# Patient Record
Sex: FEMALE | URBAN - METROPOLITAN AREA
[De-identification: names, ages, dates, MRNs, and addresses within clinical notes are randomized per-mention and may not be internally consistent; named-entity substitution may affect disease eponyms.]

---

## 2018-03-16 ENCOUNTER — OFFICE VISIT (OUTPATIENT)
Dept: FAMILY MEDICINE | Facility: CLINIC | Age: 28
End: 2018-03-16
Payer: COMMERCIAL

## 2018-03-16 VITALS
SYSTOLIC BLOOD PRESSURE: 112 MMHG | TEMPERATURE: 98.2 F | RESPIRATION RATE: 16 BRPM | HEIGHT: 67 IN | DIASTOLIC BLOOD PRESSURE: 76 MMHG | BODY MASS INDEX: 31.39 KG/M2 | HEART RATE: 79 BPM | WEIGHT: 200 LBS | OXYGEN SATURATION: 95 %

## 2018-03-16 DIAGNOSIS — N89.8 VAGINAL DISCHARGE: Primary | ICD-10-CM

## 2018-03-16 DIAGNOSIS — Z12.4 PAPANICOLAOU SMEAR FOR CERVICAL CANCER SCREENING: ICD-10-CM

## 2018-03-16 DIAGNOSIS — A59.01 TRICHOMONIASIS OF VAGINA: ICD-10-CM

## 2018-03-16 PROBLEM — F19.90 SUBSTANCE USE DISORDER: Status: ACTIVE | Noted: 2018-03-16

## 2018-03-16 LAB
BACTERIA: NORMAL
CLUE CELLS: NORMAL
MOTILE TRICHOMONAS: POSITIVE
ODOR: POSITIVE
PH WET PREP: 5.5
WBC WET PREP: NORMAL
YEAST: NORMAL

## 2018-03-16 RX ORDER — HYDROXYZINE HYDROCHLORIDE 50 MG/1
50 TABLET, FILM COATED ORAL
COMMUNITY
Start: 2018-03-02

## 2018-03-16 RX ORDER — METRONIDAZOLE 500 MG/1
500 TABLET ORAL 3 TIMES DAILY
Qty: 21 TABLET | Refills: 0 | Status: SHIPPED | OUTPATIENT
Start: 2018-03-16

## 2018-03-16 RX ORDER — HYDROXYZINE HYDROCHLORIDE 25 MG/1
25 TABLET, FILM COATED ORAL
COMMUNITY

## 2018-03-16 RX ORDER — PRAZOSIN HYDROCHLORIDE 1 MG/1
1 CAPSULE ORAL
COMMUNITY
Start: 2018-03-02

## 2018-03-16 RX ORDER — MIRTAZAPINE 15 MG/1
15 TABLET, FILM COATED ORAL
COMMUNITY
Start: 2018-03-02

## 2018-03-16 ASSESSMENT — ENCOUNTER SYMPTOMS
DIAPHORESIS: 0
LIGHT-HEADEDNESS: 0
NAUSEA: 0
DIZZINESS: 0
RHINORRHEA: 0
ABDOMINAL PAIN: 0
CHILLS: 0
VOMITING: 0
HEADACHES: 0
DIARRHEA: 0
SHORTNESS OF BREATH: 0
DIFFICULTY URINATING: 0
FEVER: 0
CONSTIPATION: 0
COUGH: 0
SORE THROAT: 0

## 2018-03-16 NOTE — PROGRESS NOTES
"      HPI:       Pam Torres is a 27 year old who presents for the following  Patient presents with:  Prenatal Care: new [facility] patient. bacterial discharge    Has had foul smelling vaginal discharge for the last couple of weeks. Denies any itching. Reports some tenderness and burning. Denies any dysuria, frequency. Not currently sexually active. Last time sexually active before October 2017. Has been at [facility] since Jan 2018. Was using Meth, Heroin, alcohol. IV drug use.     PAP smear was in October 2016. Was abnormal, had a colposcopy with biopsy. Results unknown.     Not on any birth control. Abstinence is BC. Periods are regular.     Problem, Medication and Allergy Lists were reviewed and are current.  Patient is a new patient to this clinic and so  I reviewed/updated the Past Medical History, the Family History and the Social History.          Review of Systems:   Review of Systems   Constitutional: Negative for chills, diaphoresis and fever.   HENT: Negative for congestion, rhinorrhea and sore throat.    Respiratory: Negative for cough and shortness of breath.    Cardiovascular: Negative for chest pain.   Gastrointestinal: Negative for abdominal pain, constipation, diarrhea, nausea and vomiting.   Genitourinary: Negative for difficulty urinating.   Neurological: Negative for dizziness, light-headedness and headaches.             Physical Exam:   Patient Vitals for the past 24 hrs:   BP Temp Temp src Pulse Resp SpO2 Height Weight   03/16/18 1507 112/76 98.2  F (36.8  C) Oral 79 16 95 % 5' 7.32\" (171 cm) 200 lb (90.7 kg)     Body mass index is 31.02 kg/(m^2).  Vitals were reviewed and were normal     Physical Exam   Constitutional: She appears well-developed and well-nourished.   HENT:   Head: Normocephalic and atraumatic.   Eyes: Conjunctivae and EOM are normal. Pupils are equal, round, and reactive to light. No scleral icterus.   Neck: Normal range of motion. Neck supple.   Cardiovascular: " Normal rate, regular rhythm and normal heart sounds.    Pulmonary/Chest: Effort normal and breath sounds normal.   Abdominal: Soft. Bowel sounds are normal.   Genitourinary: There is rash (erythematous macular rash bilaterally) on the right labia. There is rash on the left labia. Cervix exhibits discharge. Cervix exhibits no motion tenderness and no friability. Vaginal discharge found.         Results:      Results from the last 24 hours  Results for orders placed or performed in visit on 03/16/18 (from the past 24 hour(s))   Wet Prep ([facility])   Result Value Ref Range    Yeast Wet Prep None none    Motile Trichomonas Wet Prep Positive Negative    Clue Cells Wet Prep Present >20% NONE    WBC WET PREP 10-25 2 - 5    Bacteria Wet Prep Many None    pH Wet Prep 5.5 3.8 - 4.5    Odor Wet Prep Positive NONE     Assessment and Plan     1. Vaginal discharge  Wet prep positive for trichomoniasis. Last treatment was about a month ago. She was given a one time (4 pill) treatment. Patient does not recall the exact medication. Discussed doing a 7 day course of metronidazole. If no improvement will try an alternate medication vs vaginal treatment. Although she is at [facility], discussed limiting alcohol intake while on metronidazole. Return if symptoms don't improve.   - Wet Prep ([facility])  - NEISSERIA GONORRHOEA PCR  - CHLAMYDIA TRACHOMATIS PCR    2. Papanicolaou smear for cervical cancer screening  Last PAP smear was abnormal. Done about 3 years ago. Repeat PAP done today.   - Pap imaged thin layer screen reflex to HPV if ASCUS - recommend age 25 - 29    3. Trichomoniasis of vagina  See above.   - metroNIDAZOLE (FLAGYL) 500 MG tablet; Take 1 tablet (500 mg) by mouth 3 times daily  Dispense: 21 tablet; Refill: 0  There are no discontinued medications.  Options for treatment and follow-up care were reviewed with the patient. Pam Torres  engaged in the decision making process and verbalized understanding of the  options discussed and agreed with the final plan.    Kiana Hicks MD MPH  PGY3- [facility], Family Medicine  Pager- [phone]

## 2018-03-16 NOTE — PATIENT INSTRUCTIONS
Here is the plan from today's visit    1. Vaginal discharge    - Wet Prep ([facility])  - NEISSERIA GONORRHOEA PCR  - CHLAMYDIA TRACHOMATIS PCR    2. Papanicolaou smear for cervical cancer screening    - Pap imaged thin layer screen reflex to HPV if ASCUS - recommend age 25 - 29    3. Trichomoniasis of vagina    - metroNIDAZOLE (FLAGYL) 500 MG tablet; Take 1 tablet (500 mg) by mouth 3 times daily  Dispense: 21 tablet; Refill: 0      Please call or return to clinic if your symptoms don't go away.    Follow up plan  Please make a clinic appointment for follow up with me (SCOTT MILES) as needed.    Thank you for coming to [facility] today.  Lab Testing:  **If you had lab testing today and your results are reassuring or normal they will be mailed to you or sent through [employer] within 7 days.   **If the lab tests need quick action we will call you with the results.  The phone number we will call with results is # [phone] (home) . If this is not the best number please call our clinic and change the number.  Medication Refills:  If you need any refills please call your pharmacy and they will contact us.   If you need to  your refill at a new pharmacy, please contact the new pharmacy directly. The new pharmacy will help you get your medications transferred faster.   Scheduling:  If you have any concerns about today's visit or wish to schedule another appointment please call our office during normal business hours [phone] (8-5:00 M-F)  If a referral was made to a [facility] and you don't get a call from central scheduling please call [phone].  If a Mammogram was ordered for you at [facility] call [phone] to schedule or change your appointment.  If you had an XRay/CT/Ultrasound/MRI ordered the number is [phone] to schedule or change your radiology appointment.   Medical Concerns:  If you have urgent medical concerns please call [phone] at any time of  the day.  If you have a medical emergency please call 911.

## 2018-03-16 NOTE — PROGRESS NOTES
Preceptor Attestation:   Patient seen, evaluated and discussed with the resident. I have verified the content of the note, which accurately reflects my assessment of the patient and the plan of care.   Supervising Physician:  Sam Galarza MD

## 2018-03-16 NOTE — MR AVS SNAPSHOT
After Visit Summary   3/16/2018    Pam Torres    MRN: [id number]           Patient Information     Date Of Birth          1990        Visit Information        Provider Department      3/16/2018 3:20 PM Kiana Miles MD [facility]        Today's Diagnoses     Vaginal discharge    -  1    Papanicolaou smear for cervical cancer screening        Trichomoniasis of vagina          Care Instructions    Here is the plan from today's visit    1. Vaginal discharge    - Wet Prep ([facility])  - NEISSERIA GONORRHOEA PCR  - CHLAMYDIA TRACHOMATIS PCR    2. Papanicolaou smear for cervical cancer screening    - Pap imaged thin layer screen reflex to HPV if ASCUS - recommend age 25 - 29    3. Trichomoniasis of vagina    - metroNIDAZOLE (FLAGYL) 500 MG tablet; Take 1 tablet (500 mg) by mouth 3 times daily  Dispense: 21 tablet; Refill: 0      Please call or return to clinic if your symptoms don't go away.    Follow up plan  Please make a clinic appointment for follow up with me (KIANA MILES) as needed.    Thank you for coming to [facility] today.  Lab Testing:  **If you had lab testing today and your results are reassuring or normal they will be mailed to you or sent through [employer] within 7 days.   **If the lab tests need quick action we will call you with the results.  The phone number we will call with results is # [phone] (home) . If this is not the best number please call our clinic and change the number.  Medication Refills:  If you need any refills please call your pharmacy and they will contact us.   If you need to  your refill at a new pharmacy, please contact the new pharmacy directly. The new pharmacy will help you get your medications transferred faster.   Scheduling:  If you have any concerns about today's visit or wish to schedule another appointment please call our office during normal business hours [phone] (8-5:00 M-F)  If a referral was made  "to a [facility] and you don't get a call from central scheduling please call [phone].  If a Mammogram was ordered for you at [facility] call [phone] to schedule or change your appointment.  If you had an XRay/CT/Ultrasound/MRI ordered the number is [phone] to schedule or change your radiology appointment.   Medical Concerns:  If you have urgent medical concerns please call [phone] at any time of the day.  If you have a medical emergency please call [phone].            Follow-ups after your visit        Who to contact     Please call your clinic at [phone] to:    Ask questions about your health    Make or cancel appointments    Discuss your medicines    Learn about your test results    Speak to your doctor            Additional Information About Your Visit        Care EveryWhere ID     This is your Care EveryWhere ID. This could be used by other organizations to access your [facility] medical records  [id number]        Your Vitals Were     Pulse Temperature Respirations Height Last Period Pulse Oximetry    79 98.2  F (36.8  C) (Oral) 16 5' 7.32\" (171 cm) 02/26/2018 95%    BMI (Body Mass Index)                   31.02 kg/m2            Blood Pressure from Last 3 Encounters:   03/16/18 112/76    Weight from Last 3 Encounters:   03/16/18 200 lb (90.7 kg)              We Performed the Following     CHLAMYDIA TRACHOMATIS PCR     NEISSERIA GONORRHOEA PCR     Pap imaged thin layer screen reflex to HPV if ASCUS - recommend age 25 - 29     Wet Prep ([facility])          Today's Medication Changes          These changes are accurate as of 3/16/18  4:17 PM.  If you have any questions, ask your nurse or doctor.               Start taking these medicines.        Dose/Directions    metroNIDAZOLE 500 MG tablet   Commonly known as:  FLAGYL   Used for:  Trichomoniasis of vagina   Started by:  Kiana Hicks MD        Dose:  500 mg   Take 1 tablet (500 mg) by mouth 3 times daily "   Quantity:  21 tablet   Refills:  0            Where to get your medicines      These medications were [place] [facility] [place] [address]  [address], [place] [address]     Phone:  [phone]     metroNIDAZOLE 500 MG tablet                Primary Care Provider Fax #    Physician No Ref-Primary [phone]       No address on file        Equal Access to Services     [facility] : Hadii aad ku hadasho Soomaali, waaxda luqadaha, qaybta kaalmada adeegyada, waxay idiin hayaan adeeg carmen lajose luis ah. So [facility] [phone].    ATENCIÓN: Si habla español, tiene a maxwell disposición servicios gratuitos de asistencia lingüística. Deepthi al [phone].    We comply with applicable federal civil rights laws and [place] laws. We do not discriminate on the basis of race, color, national origin, age, disability, sex, sexual orientation, or gender identity.            Thank you!     Thank you for choosing [facility]  for your care. Our goal is always to provide you with excellent care. Hearing back from our patients is one way we can continue to improve our services. Please take a few minutes to complete the written survey that you may receive in the mail after your visit with us. Thank you!             Your Updated Medication List - Protect others around you: Learn how to safely use, store and throw away your medicines at www.disposemymeds.org.          This list is accurate as of 3/16/18  4:17 PM.  Always use your most recent med list.                   Brand Name Dispense Instructions for use Diagnosis    * hydrOXYzine 25 MG tablet    ATARAX     Take 25 mg by mouth        * hydrOXYzine 50 MG tablet    ATARAX     Take 50 mg by mouth        metroNIDAZOLE 500 MG tablet    FLAGYL    21 tablet    Take 1 tablet (500 mg) by mouth 3 times daily    Trichomoniasis of vagina       mirtazapine 15 MG tablet    REMERON     Take 15 mg by mouth        prazosin 1 MG  capsule    MINIPRESS     Take 1 mg by mouth        * Notice:  This list has 2 medication(s) that are the same as other medications prescribed for you. Read the directions carefully, and ask your doctor or other care provider to review them with you.

## 2018-03-16 NOTE — LETTER
March 22, 2018      Pam Torres  [address] [facility] [address]        Dear Pam,    Thank you for getting your care at [facility]. Please see below for your test results.    Resulted Orders   Wet Prep (hospitals)   Result Value Ref Range    Yeast Wet Prep None none    Motile Trichomonas Wet Prep Positive Negative    Clue Cells Wet Prep Present >20% NONE    WBC WET PREP 10-25 2 - 5    Bacteria Wet Prep Many None    pH Wet Prep 5.5 3.8 - 4.5    Odor Wet Prep Positive NONE   NEISSERIA GONORRHOEA PCR   Result Value Ref Range    Specimen Descrip Cervical     N Gonorrhea PCR Negative NEG^Negative      Comment:      Negative for N. gonorrhoeae rRNA by transcription mediated amplification.  A negative result by transcription mediated amplification does not preclude   the presence of N. gonorrhoeae infection because results are dependent on   proper and adequate collection, absence of inhibitors, and sufficient rRNA to   be detected.     CHLAMYDIA TRACHOMATIS PCR   Result Value Ref Range    Specimen Description Cervical     Chlamydia Trachomatis PCR Negative NEG^Negative      Comment:      Negative for C. trachomatis rRNA by transcription mediated amplification.  A negative result by transcription mediated amplification does not preclude   the presence of C. trachomatis infection because results are dependent on   proper and adequate collection, absence of inhibitors, and sufficient rRNA to   be detected.     Pap imaged thin layer screen reflex to HPV if ASCUS - recommend age 25 - 29   Result Value Ref Range    PAP NIL     Copath Report         Acc#: [id number]   Signed: 3/20/2018 14:47   MR#: [id number]    SPECIMEN/STAIN PROCESS:  Pap imaged thin layer prep screening (Surepath, FocalPoint with guided   screening)       Pap-Cyto x 1, Pap with reflex to HPV if ASCUS x 1    SOURCE: Cervical, endocervical  ----------------------------------------------------------------   Pap imaged thin layer prep screening  (Surepath, FocalPoint with guided   screening)  SPECIMEN ADEQUACY:  Satisfactory for evaluation.  -Transformation zone component present.    CYTOLOGIC INTERPRETATION:    Negative for intraepithelial lesion or malignancy    Electronically signed by:  GIACOMO Burns (ASCP)    CLINICAL HISTORY:  LMP: 2/26/2018    Papanicolaou Test Limitations:  Cervical cytology is a screening test with   limited sensitivity; regular  screening is critical for cancer prevention; Pap tests are primarily   effective for the diagnosis/prevention of  squamous cell carcinoma, not adenocarcinomas or other cancers.  TESTING LAB LOCATION:  [facility] [employer] [address] [address], [phone]  Processed and screened at [facility],   Replaced by [facility]         If you have any concerns about these results please call and leave a message for me or send a MyChart message to the clinic.    Sincerely,    Kiana Hicks MD

## 2018-03-18 LAB
C TRACH DNA SPEC QL NAA+PROBE: NEGATIVE
N GONORRHOEA DNA SPEC QL NAA+PROBE: NEGATIVE
SPECIMEN SOURCE: NORMAL
SPECIMEN SOURCE: NORMAL

## 2018-03-20 LAB
COPATH REPORT: NORMAL
PAP: NORMAL

## 2018-04-24 ENCOUNTER — OFFICE VISIT (OUTPATIENT)
Dept: FAMILY MEDICINE | Facility: CLINIC | Age: 28
End: 2018-04-24
Payer: COMMERCIAL

## 2018-04-24 VITALS
BODY MASS INDEX: 31.08 KG/M2 | HEART RATE: 89 BPM | HEIGHT: 67 IN | WEIGHT: 198 LBS | DIASTOLIC BLOOD PRESSURE: 75 MMHG | OXYGEN SATURATION: 96 % | SYSTOLIC BLOOD PRESSURE: 113 MMHG | RESPIRATION RATE: 16 BRPM | TEMPERATURE: 98 F

## 2018-04-24 DIAGNOSIS — Z13.9 SCREENING FOR CONDITION: Primary | ICD-10-CM

## 2018-04-24 DIAGNOSIS — R00.2 PALPITATIONS: ICD-10-CM

## 2018-04-24 LAB — TSH SERPL DL<=0.005 MIU/L-ACNC: 1.24 MU/L (ref 0.4–4)

## 2018-04-24 NOTE — PATIENT INSTRUCTIONS
Here is the plan from today's visit    1. Screening for condition    - EKG 12-lead complete w/read - Clinics  - Your EKG looks fine. No findings suggestive of WPW syndrome.     2. Palpitations  - TSH with free T4 reflex    Please call or return to clinic if your symptoms don't go away.    Follow up plan: As needed    Thank you for coming to [facility] today.  Lab Testing:  **If you had lab testing today and your results are reassuring or normal they will be mailed to you or sent through [employer] within 7 days.   **If the lab tests need quick action we will call you with the results.  The phone number we will call with results is # [phone] (home) . If this is not the best number please call our clinic and change the number.  Medication Refills:  If you need any refills please call your pharmacy and they will contact us.   If you need to  your refill at a new pharmacy, please contact the new pharmacy directly. The new pharmacy will help you get your medications transferred faster.   Scheduling:  If you have any concerns about today's visit or wish to schedule another appointment please call our office during normal business hours [phone] (8-5:00 M-F)  If a referral was made to a [facility] and you don't get a call from central scheduling please call [phone].  If a Mammogram was ordered for you at [facility] call [phone] to schedule or change your appointment.  If you had an XRay/CT/Ultrasound/MRI ordered the number is [phone] to schedule or change your radiology appointment.   Medical Concerns:  If you have urgent medical concerns please call [phone] at any time of the day.    Poncho Robbins MD

## 2018-04-24 NOTE — PROGRESS NOTES
HPI:       Pam Torres is a 27 year old who presents for the following  Patient presents with:  RECHECK: Mother has renea parkinson white syndrome and would like to check her heart as well    This is a 27 year old female with a past medical history significant for substance abuse (Meth and heroine), depression, and anxiety who presented to the clinic for evaluation of palpitation. Patient reports that her mother was diagnosed with WPW syndrome last year and underwent electrophysiology study. Patient complained of occasional palpitation that occurs intermittently. She feels fine otherwise and denies dizziness, chest pain, shortness of breath, nausea and vomiting. She is currently in [place] [facility]. She has baseline depression, anxiety and PTSD. She has been through several antidepressants in the past. She recently discontinued Remeron due to weight gain. She gets benefit from Prazosin for nightmares and has Atarax as needed for anxiety. She has no other complaints.     Problem, Medication and Allergy Lists were   reviewed and are current.     Patient Active Problem List    Diagnosis Date Noted     Substance use disorder 03/16/2018     Priority: Medium     In remission for heroin and methamphetamine use as of 03/15/18           Current Outpatient Prescriptions   Medication Sig Dispense Refill     hydrOXYzine (ATARAX) 25 MG tablet Take 25 mg by mouth       hydrOXYzine (ATARAX) 50 MG tablet Take 50 mg by mouth       metroNIDAZOLE (FLAGYL) 500 MG tablet Take 1 tablet (500 mg) by mouth 3 times daily 21 tablet 0     mirtazapine (REMERON) 15 MG tablet Take 15 mg by mouth       prazosin ([facility]) 1 MG capsule Take 1 mg by mouth           Allergies   Allergen Reactions     Diagnostic X-Ray Materials Hives     Patient is an established patient of this clinic.         Review of Systems:   Review of Systems 10 point review of system negative except as mentioned in HPI.           Physical  "Exam:   Patient Vitals for the past 24 hrs:   BP Temp Temp src Pulse Resp SpO2 Height Weight   04/24/18 1526 113/75 98  F (36.7  C) Oral 89 16 96 % 5' 7.32\" (171 cm) 198 lb (89.8 kg)     Body mass index is 30.72 kg/(m^2).  Vitals were reviewed and were normal     Physical Exam   Constitutional: She appears well-developed and well-nourished.   HENT:   Head: Normocephalic.   Eyes: Conjunctivae are normal.   Cardiovascular: Normal rate and regular rhythm.    Pulmonary/Chest: Effort normal and breath sounds normal.   Skin: Skin is warm. No rash noted.   On exposed skin   Psychiatric: She has a normal mood and affect.       Results:      Results from the last 24 hoursNo results found for this or any previous visit (from the past 24 hour(s)).  Assessment and Plan     Pam was seen today for recheck.    Diagnoses and all orders for this visit:  Palpitations:  EKG obtained which returned unremarkable for acute abnormalities. Patient was reassured. Thyroid function will be evaluated. Patient will follow up as needed. EKG reviewed with attending physician.   -     EKG 12-lead complete w/read - Clinics  -     TSH with free T4 reflex      There are no discontinued medications.  Options for treatment and follow-up care were reviewed with the patient. Pam Torres  engaged in the decision making process and verbalized understanding of the options discussed and agreed with the final plan.    Poncho Govea MD  PGY3 [facility] Resident   Pager: [phone]        "

## 2018-04-24 NOTE — MR AVS SNAPSHOT
After Visit Summary   4/24/2018    Pam Torres    MRN: [id number]           Patient Information     Date Of Birth          1990        Visit Information        Provider Department      4/24/2018 3:40 PM [facility]        Today's Diagnoses     Screening for condition    -  1    Palpitations          Care Instructions    Here is the plan from today's visit    1. Screening for condition    - EKG 12-lead complete w/read - Clinics  - Your EKG looks fine. No findings suggestive of WPW syndrome.     2. Palpitations  - TSH with free T4 reflex    Please call or return to clinic if your symptoms don't go away.    Follow up plan: As needed    Thank you for coming to [facility] today.  Lab Testing:  **If you had lab testing today and your results are reassuring or normal they will be mailed to you or sent through [employer] within 7 days.   **If the lab tests need quick action we will call you with the results.  The phone number we will call with results is # [phone] (home) . If this is not the best number please call our clinic and change the number.  Medication Refills:  If you need any refills please call your pharmacy and they will contact us.   If you need to  your refill at a new pharmacy, please contact the new pharmacy directly. The new pharmacy will help you get your medications transferred faster.   Scheduling:  If you have any concerns about today's visit or wish to schedule another appointment please call our office during normal business hours [phone] (8-5:00 M-F)  If a referral was made to a [facility] and you don't get a call from central scheduling please call [phone].  If a Mammogram was ordered for you at [facility] call [phone] to schedule or change your appointment.  If you had an XRay/CT/Ultrasound/MRI ordered the number is [phone] to schedule or change your radiology  "appointment.   Medical Concerns:  If you have urgent medical concerns please call [phone] at any time of the day.    Poncho Robbins MD            Follow-ups after your visit        Who to contact     Please call your clinic at [phone] to:    Ask questions about your health    Make or cancel appointments    Discuss your medicines    Learn about your test results    Speak to your doctor            Additional Information About Your Visit        Care EveryWhere ID     This is your Care EveryWhere ID. This could be used by other organizations to access your [facility] records  [id number]        Your Vitals Were     Pulse Temperature Respirations Height Pulse Oximetry Breastfeeding?    89 98  F (36.7  C) (Oral) 16 5' 7.32\" (171 cm) 96% No    BMI (Body Mass Index)                   30.72 kg/m2            Blood Pressure from Last 3 Encounters:   04/24/18 113/75   03/16/18 112/76    Weight from Last 3 Encounters:   04/24/18 198 lb (89.8 kg)   03/16/18 200 lb (90.7 kg)              We Performed the Following     EKG 12-lead complete w/read - Clinics     TSH with free T4 reflex        Primary Care Provider Office Phone # Fax #    Poncho Robbins [phone]       [address] [id number]        Equal Access to Services     KAELYN GARCIA AH: Hadsara aguiaro Sololita, waaxda luqadaha, qaybta kaalmada adeegyada, moe claudio. So [facility] [phone].    ATENCIÓN: Si habla español, tiene a maxwell disposición servicios gratuitos de asistencia lingüística. Llame al [phone].    We comply with applicable federal civil rights laws and [place] laws. We do not discriminate on the basis of race, color, national origin, age, disability, sex, sexual orientation, or gender identity.            Thank you!     Thank you for choosing [facility]  for your care. Our goal is always to provide you with excellent care. Hearing " back from our patients is one way we can continue to improve our services. Please take a few minutes to complete the written survey that you may receive in the mail after your visit with us. Thank you!             Your Updated Medication List - Protect others around you: Learn how to safely use, store and throw away your medicines at www.disposemymeds.org.          This list is accurate as of 4/24/18  3:53 PM.  Always use your most recent med list.                   Brand Name Dispense Instructions for use Diagnosis    * hydrOXYzine 25 MG tablet    ATARAX     Take 25 mg by mouth        * hydrOXYzine 50 MG tablet    ATARAX     Take 50 mg by mouth        metroNIDAZOLE 500 MG tablet    FLAGYL    21 tablet    Take 1 tablet (500 mg) by mouth 3 times daily    Trichomoniasis of vagina       mirtazapine 15 MG tablet    REMERON     Take 15 mg by mouth        prazosin 1 MG capsule    MINIPRESS     Take 1 mg by mouth        * Notice:  This list has 2 medication(s) that are the same as other medications prescribed for you. Read the directions carefully, and ask your doctor or other care provider to review them with you.

## 2018-04-24 NOTE — PROGRESS NOTES
Preceptor Attestation:   Patient seen, evaluated and discussed with the resident. I personally viewed the EKG and agree with the interpretation documented by the resident. I have verified the content of the note, which accurately reflects my assessment of the patient and the plan of care.   Supervising Physician:  Pio Grider MD

## 2018-10-31 ENCOUNTER — OFFICE VISIT (OUTPATIENT)
Dept: FAMILY MEDICINE | Facility: CLINIC | Age: 28
End: 2018-10-31
Payer: COMMERCIAL

## 2018-10-31 VITALS
BODY MASS INDEX: 30.44 KG/M2 | DIASTOLIC BLOOD PRESSURE: 80 MMHG | OXYGEN SATURATION: 97 % | WEIGHT: 196.2 LBS | SYSTOLIC BLOOD PRESSURE: 137 MMHG | RESPIRATION RATE: 12 BRPM | HEART RATE: 63 BPM

## 2018-10-31 DIAGNOSIS — R68.84 JAW PAIN: Primary | ICD-10-CM

## 2018-10-31 PROBLEM — F17.200 TOBACCO USE DISORDER: Status: ACTIVE | Noted: 2017-06-21

## 2018-10-31 PROBLEM — F11.20 OPIOID TYPE DEPENDENCE, ABUSE (H): Status: ACTIVE | Noted: 2017-06-21

## 2018-10-31 PROBLEM — F90.9 ATTENTION DEFICIT HYPERACTIVITY DISORDER (ADHD): Status: ACTIVE | Noted: 2017-06-21

## 2018-10-31 PROBLEM — Z87.42 PERSONAL HISTORY OF OVARIAN CYST: Status: ACTIVE | Noted: 2017-06-21

## 2018-10-31 PROBLEM — F12.11 CANNABIS USE DISORDER, MILD, IN SUSTAINED REMISSION: Status: ACTIVE | Noted: 2017-06-21

## 2018-10-31 PROBLEM — F15.90 STIMULANT USE DISORDER: Status: ACTIVE | Noted: 2017-06-21

## 2018-10-31 RX ORDER — ACETAMINOPHEN 500 MG
1000 TABLET ORAL EVERY 6 HOURS PRN
Qty: 100 TABLET | Refills: 0 | Status: SHIPPED | OUTPATIENT
Start: 2018-10-31

## 2018-10-31 RX ORDER — NAPROXEN 500 MG/1
500 TABLET ORAL 2 TIMES DAILY WITH MEALS
Qty: 60 TABLET | Refills: 0 | Status: SHIPPED | OUTPATIENT
Start: 2018-10-31

## 2018-10-31 NOTE — PATIENT INSTRUCTIONS
Here is the plan from today's visit    1. Jaw pain  -Ice twice daily for 15 minutes  -3 Good things. At the end of the day write down three good things that happened, the emotion you felt with it, and how you made that happen  -[facility] meditation website: [email or web address]  - 1 naproxen twice daily, 1000 mg tylenol every 6 hours as needed    Please call or return to clinic if your symptoms don't go away.    Follow up plan  Please make a clinic appointment for follow up with your primary physician Odell Sanchez MD in 2 weeks for reevaluation.    Thank you for coming to [facility] today.  Lab Testing:  **If you had lab testing today and your results are reassuring or normal they will be mailed to you or sent through [employer] within 7 days.   **If the lab tests need quick action we will call you with the results.  The phone number we will call with results is # [phone] (home) . If this is not the best number please call our clinic and change the number.  Medication Refills:  If you need any refills please call your pharmacy and they will contact us.   If you need to  your refill at a new pharmacy, please contact the new pharmacy directly. The new pharmacy will help you get your medications transferred faster.   Scheduling:  If you have any concerns about today's visit or wish to schedule another appointment please call our office during normal business hours [phone] (8-5:00 M-F)  If a referral was made to a [facility] and you don't get a call from central scheduling please call [phone].  If a Mammogram was ordered for you at [facility] call [phone] to schedule or change your appointment.  If you had an XRay/CT/Ultrasound/MRI ordered the number is [phone] to schedule or change your radiology appointment.   Medical Concerns:  If you have urgent medical concerns please call [phone] at any time of the day.    Elise FARLEY  MD [place]

## 2018-10-31 NOTE — MR AVS SNAPSHOT
After Visit Summary   10/31/2018    Pam Torres    MRN: [id number]           Patient Information     Date Of Birth          1990        Visit Information        Provider Department      10/31/2018 4:20 PM Elise Flanagan MD [facility]        Today's Diagnoses     Jaw pain    -  1      Care Instructions    Here is the plan from today's visit    1. Jaw pain  -Ice twice daily for 15 minutes  -3 Good things. At the end of the day write down three good things that happened, the emotion you felt with it, and how you made that happen  -[facility] meditation website: [email or web address]  - 1 naproxen twice daily, 1000 mg tylenol every 6 hours as needed    Please call or return to clinic if your symptoms don't go away.    Follow up plan  Please make a clinic appointment for follow up with your primary physician Odell Sanchez MD in 2 weeks for reevaluation.    Thank you for coming to [facility] today.  Lab Testing:  **If you had lab testing today and your results are reassuring or normal they will be mailed to you or sent through [employer] within 7 days.   **If the lab tests need quick action we will call you with the results.  The phone number we will call with results is # [phone] (home) . If this is not the best number please call our clinic and change the number.  Medication Refills:  If you need any refills please call your pharmacy and they will contact us.   If you need to  your refill at a new pharmacy, please contact the new pharmacy directly. The new pharmacy will help you get your medications transferred faster.   Scheduling:  If you have any concerns about today's visit or wish to schedule another appointment please call our office during normal business hours [phone] (8-5:00 M-F)  If a referral was made to a [facility] and you don't get a call from central scheduling please call [phone].  If  a Mammogram was ordered for you at [facility] call [phone] to schedule or change your appointment.  If you had an XRay/CT/Ultrasound/MRI ordered the number is [phone] to schedule or change your radiology appointment.   Medical Concerns:  If you have urgent medical concerns please call [phone] at any time of the day.    Elise Flanagan MD          Follow-ups after your visit        Who to contact     Please call your clinic at [phone] to:    Ask questions about your health    Make or cancel appointments    Discuss your medicines    Learn about your test results    Speak to your doctor            Additional Information About Your Visit        Care EveryWhere ID     This is your Care EveryWhere ID. This could be used by other organizations to access your [employer] medical records  [id number]        Your Vitals Were     Pulse Respirations Pulse Oximetry BMI (Body Mass Index)          63 12 97% 30.44 kg/m2         Blood Pressure from Last 3 Encounters:   10/31/18 137/80   04/24/18 113/75   03/16/18 112/76    Weight from Last 3 Encounters:   10/31/18 196 lb 3.2 oz (89 kg)   04/24/18 198 lb (89.8 kg)   03/16/18 200 lb (90.7 kg)              Today, you had the following     No orders found for display         Today's Medication Changes          These changes are accurate as of 10/31/18  5:08 PM.  If you have any questions, ask your nurse or doctor.               Start taking these medicines.        Dose/Directions    acetaminophen 500 MG tablet   Commonly known as:  TYLENOL   Used for:  Jaw pain   Started by:  Elise Flanagan MD        Dose:  1000 mg   Take 2 tablets (1,000 mg) by mouth every 6 hours as needed for mild pain   Quantity:  100 tablet   Refills:  0       naproxen 500 MG tablet   Commonly known as:  NAPROSYN   Used for:  Jaw pain   Started by:  Elise Flanagan MD        Dose:  500 mg   Take 1 tablet (500 mg) by mouth 2 times daily (with meals)   Quantity:  60 tablet   Refills:  0             Where to get your medicines      These medications were sent to [id number] - [place], [place] - [address]  [address], [place] [address]     Phone:  [phone]     acetaminophen 500 MG tablet    naproxen 500 MG tablet                Primary Care Provider Office Phone # Fax #    Odell Sanchez [phone]       [address] [place] [address]        Equal Access to Services     KAELYN GARCIA : Hadii aad ku hadasho Soomaali, waaxda luqadaha, qaybta kaalmada adeegyada, waxay idiin hayaan adeeg kharakevan hartmann . So [facility] [phone].    ATENCIÓN: Si kamar bullard, tiene a maxwell disposición servicios gratuitos de asistencia lingüística. [facility] [phone].    We comply with applicable federal civil rights laws and [place] laws. We do not discriminate on the basis of race, color, national origin, age, disability, sex, sexual orientation, or gender identity.            Thank you!     Thank you for choosing [facility]  for your care. Our goal is always to provide you with excellent care. Hearing back from our patients is one way we can continue to improve our services. Please take a few minutes to complete the written survey that you may receive in the mail after your visit with us. Thank you!             Your Updated Medication List - Protect others around you: Learn how to safely use, store and throw away your medicines at www.disposemymeds.org.          This list is accurate as of 10/31/18  5:08 PM.  Always use your most recent med list.                   Brand Name Dispense Instructions for use Diagnosis    acetaminophen 500 MG tablet    TYLENOL    100 tablet    Take 2 tablets (1,000 mg) by mouth every 6 hours as needed for mild pain    Jaw pain       * hydrOXYzine 25 MG tablet    ATARAX     Take 25 mg by mouth        * hydrOXYzine 50 MG tablet    ATARAX     Take 50 mg by mouth        metroNIDAZOLE 500 MG tablet     FLAGYL    21 tablet    Take 1 tablet (500 mg) by mouth 3 times daily    Trichomoniasis of vagina       mirtazapine 15 MG tablet    REMERON     Take 15 mg by mouth        naproxen 500 MG tablet    NAPROSYN    60 tablet    Take 1 tablet (500 mg) by mouth 2 times daily (with meals)    Jaw pain       prazosin 1 MG capsule    MINIPRESS     Take 1 mg by mouth        * Notice:  This list has 2 medication(s) that are the same as other medications prescribed for you. Read the directions carefully, and ask your doctor or other care provider to review them with you.

## 2018-10-31 NOTE — PROGRESS NOTES
HPI       Pam Torres is a 28 year old  who presents for   Chief Complaint   Patient presents with     Otalgia         Concern: left sided jaw pain   Description of the problem :Pain started yesterday morning. It started behind her ear and moved to the front of the ear. Sensitive to the touch. No associated ear pain, fevers, chills, nasal congestion, sinus pressure, sore throat, associated hearing loss, headaches. Has never had this happen before. Tried ibuprofen, but unsure if it worked because she went right to sleep after taking it.  One friend is sick in the [employer] program. 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 Extra stress in her life because the courts are trying to take away custody of her daughter.       +++++++      Problem, Medication and Allergy Lists were reviewed and updated if needed..    Patient is an established patient of this clinic..         Review of Systems:   Review of Systems  Negative except noted in HPI       Physical Exam:     Vitals:    10/31/18 1603   BP: 137/80   Pulse: 63   Resp: 12   SpO2: 97%   Weight: 196 lb 3.2 oz (89 kg)     Body mass index is 30.44 kg/(m^2).  Vital signs normal except BMI is elevated     Physical Exam   Constitutional: She is oriented to person, place, and time. She appears well-developed and well-nourished.   HENT:   Head:       Right Ear: Hearing, tympanic membrane, external ear and ear canal normal.   Left Ear: Hearing, tympanic membrane, external ear and ear canal normal.   Nose: No mucosal edema or rhinorrhea. Right sinus exhibits no maxillary sinus tenderness and no frontal sinus tenderness. Left sinus exhibits no maxillary sinus tenderness and no frontal sinus tenderness.   Mouth/Throat: Oropharynx is clear and moist and mucous membranes are normal. No oropharyngeal exudate.   Tenderness to palpation of red areas noted. Unable to fully open jaw due to pain.  No clicking or popping felt or heard when opening jaw. Does not appear to be out of  socket.   Eyes: Conjunctivae are normal.   Neck: Neck supple.   Cardiovascular: Normal rate.    Pulmonary/Chest: Effort normal.   Lymphadenopathy:     She has no cervical adenopathy.   Neurological: She is alert and oriented to person, place, and time.         Results:   No testing ordered today    Assessment and Plan        Pam was seen today for otalgia.    Diagnoses and all orders for this visit:    Jaw pain - Jaw pain most consistent with TMJ. In the acute phase recommend naproxen 1 pill BID for 7 days and tylenol 1000 mg Q6H PRN for additional pain relief. Ice BID for 15 minutes. For stress relief: [facility] meditation [facility] given for meditation daily as well as 3 good things (note 3 good things at the end of each day, your mood felt, and how you made that happen). Would like her to follow up in 2 weeks if discomfort has not improved.  -     acetaminophen (TYLENOL) 500 MG tablet; Take 2 tablets (1,000 mg) by mouth every 6 hours as needed for mild pain  -     naproxen (NAPROSYN) 500 MG tablet; Take 1 tablet (500 mg) by mouth 2 times daily (with meals)           There are no discontinued medications.    Options for treatment and follow-up care were reviewed with the patient. Pam Torres  engaged in the decision making process and verbalized understanding of the options discussed and agreed with the final plan.    Elise Flanagan MD